# Patient Record
Sex: FEMALE | Race: WHITE | HISPANIC OR LATINO | URBAN - METROPOLITAN AREA
[De-identification: names, ages, dates, MRNs, and addresses within clinical notes are randomized per-mention and may not be internally consistent; named-entity substitution may affect disease eponyms.]

---

## 2018-07-23 ENCOUNTER — EMERGENCY (EMERGENCY)
Facility: HOSPITAL | Age: 53
LOS: 0 days | Discharge: HOME | End: 2018-07-23
Attending: EMERGENCY MEDICINE
Payer: MEDICAID

## 2018-07-23 VITALS
RESPIRATION RATE: 18 BRPM | HEART RATE: 79 BPM | DIASTOLIC BLOOD PRESSURE: 85 MMHG | TEMPERATURE: 98 F | OXYGEN SATURATION: 99 % | WEIGHT: 199.96 LBS | SYSTOLIC BLOOD PRESSURE: 120 MMHG | HEIGHT: 68 IN

## 2018-07-23 DIAGNOSIS — M25.571 PAIN IN RIGHT ANKLE AND JOINTS OF RIGHT FOOT: ICD-10-CM
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
## 2018-07-23 DIAGNOSIS — S93.491A SPRAIN OF OTHER LIGAMENT OF RIGHT ANKLE, INITIAL ENCOUNTER: ICD-10-CM
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
## 2018-07-23 PROCEDURE — 99283 EMERGENCY DEPT VISIT LOW MDM: CPT

## 2018-07-23 PROCEDURE — 73630 X-RAY EXAM OF FOOT: CPT | Mod: 26,RT

## 2018-07-23 PROCEDURE — 73610 X-RAY EXAM OF ANKLE: CPT | Mod: 26,RT

## 2018-07-23 RX ORDER — IBUPROFEN 200 MG
600 TABLET ORAL ONCE
Qty: 0 | Refills: 0 | Status: COMPLETED | OUTPATIENT
Start: 2018-07-23 | End: 2018-07-23

## 2018-07-23 RX ADMIN — Medication 600 MILLIGRAM(S): at 19:18

## 2018-07-23 NOTE — ED ADULT NURSE REASSESSMENT NOTE - NS ED NURSE REASSESS COMMENT FT1
ao x3 , xray negative for fracture , patient right ankle splinted , crutches provided , patient teaching on proper use done , teach back achieved . d/c . no sign of distress

## 2018-07-23 NOTE — ED ADULT NURSE NOTE - CAS TRG GENERAL NORM CIRC DET
Capillary refill less/equal to 2 seconds/Bounding peripheral pulses/No visible significant external bleeding/Strong peripheral pulses

## 2018-07-23 NOTE — ED PROVIDER NOTE - MUSCULOSKELETAL, MLM
Right ankle range of motion is not limited, + tenderness and swelling Right ankle range of motion is not limited, + lateral tenderness and swelling, + inversion tenderness

## 2018-07-23 NOTE — ED ADULT TRIAGE NOTE - CHIEF COMPLAINT QUOTE
pt complaining of right ankle pain. states she stepped into a pot hole and twisted her ankle. denies head injury.

## 2018-12-05 ENCOUNTER — APPOINTMENT (OUTPATIENT)
Dept: ORTHOPEDIC SURGERY | Facility: CLINIC | Age: 53
End: 2018-12-05
Payer: MEDICAID

## 2018-12-05 VITALS
WEIGHT: 238 LBS | DIASTOLIC BLOOD PRESSURE: 87 MMHG | SYSTOLIC BLOOD PRESSURE: 120 MMHG | BODY MASS INDEX: 36.07 KG/M2 | HEIGHT: 68 IN | HEART RATE: 69 BPM

## 2018-12-05 DIAGNOSIS — M48.061 SPINAL STENOSIS, LUMBAR REGION WITHOUT NEUROGENIC CLAUDICATION: ICD-10-CM

## 2018-12-05 DIAGNOSIS — Z80.3 FAMILY HISTORY OF MALIGNANT NEOPLASM OF BREAST: ICD-10-CM

## 2018-12-05 DIAGNOSIS — M51.26 OTHER INTERVERTEBRAL DISC DISPLACEMENT, LUMBAR REGION: ICD-10-CM

## 2018-12-05 DIAGNOSIS — M54.2 CERVICALGIA: ICD-10-CM

## 2018-12-05 DIAGNOSIS — M54.5 LOW BACK PAIN: ICD-10-CM

## 2018-12-05 PROCEDURE — 99204 OFFICE O/P NEW MOD 45 MIN: CPT

## 2018-12-05 RX ORDER — FOLIC ACID/MULTIVIT,IRON,MINER 0.4MG-18MG
TABLET ORAL
Refills: 0 | Status: ACTIVE | COMMUNITY

## 2018-12-05 RX ORDER — MAGNESIUM OXIDE/MAG AA CHELATE 300 MG
CAPSULE ORAL
Refills: 0 | Status: ACTIVE | COMMUNITY

## 2018-12-05 RX ORDER — MULTIVITAMIN
TABLET ORAL
Refills: 0 | Status: ACTIVE | COMMUNITY

## 2020-02-14 ENCOUNTER — EMERGENCY (EMERGENCY)
Facility: HOSPITAL | Age: 55
LOS: 0 days | Discharge: HOME | End: 2020-02-14
Attending: EMERGENCY MEDICINE
Payer: COMMERCIAL

## 2020-02-14 VITALS
OXYGEN SATURATION: 100 % | RESPIRATION RATE: 18 BRPM | HEART RATE: 83 BPM | WEIGHT: 250 LBS | HEIGHT: 68 IN | DIASTOLIC BLOOD PRESSURE: 86 MMHG | SYSTOLIC BLOOD PRESSURE: 136 MMHG | TEMPERATURE: 98 F

## 2020-02-14 VITALS
DIASTOLIC BLOOD PRESSURE: 62 MMHG | SYSTOLIC BLOOD PRESSURE: 128 MMHG | OXYGEN SATURATION: 99 % | RESPIRATION RATE: 18 BRPM | TEMPERATURE: 98 F | HEART RATE: 76 BPM

## 2020-02-14 DIAGNOSIS — G89.29 OTHER CHRONIC PAIN: ICD-10-CM

## 2020-02-14 DIAGNOSIS — M54.5 LOW BACK PAIN: ICD-10-CM

## 2020-02-14 DIAGNOSIS — M54.17 RADICULOPATHY, LUMBOSACRAL REGION: ICD-10-CM

## 2020-02-14 PROCEDURE — 72148 MRI LUMBAR SPINE W/O DYE: CPT | Mod: 26

## 2020-02-14 PROCEDURE — 99284 EMERGENCY DEPT VISIT MOD MDM: CPT

## 2020-02-14 RX ORDER — SODIUM CHLORIDE 9 MG/ML
1000 INJECTION INTRAMUSCULAR; INTRAVENOUS; SUBCUTANEOUS ONCE
Refills: 0 | Status: COMPLETED | OUTPATIENT
Start: 2020-02-14 | End: 2020-02-14

## 2020-02-14 RX ORDER — MORPHINE SULFATE 50 MG/1
4 CAPSULE, EXTENDED RELEASE ORAL ONCE
Refills: 0 | Status: DISCONTINUED | OUTPATIENT
Start: 2020-02-14 | End: 2020-02-14

## 2020-02-14 RX ORDER — METHOCARBAMOL 500 MG/1
750 TABLET, FILM COATED ORAL ONCE
Refills: 0 | Status: COMPLETED | OUTPATIENT
Start: 2020-02-14 | End: 2020-02-14

## 2020-02-14 RX ORDER — KETOROLAC TROMETHAMINE 30 MG/ML
30 SYRINGE (ML) INJECTION ONCE
Refills: 0 | Status: DISCONTINUED | OUTPATIENT
Start: 2020-02-14 | End: 2020-02-14

## 2020-02-14 RX ORDER — METHOCARBAMOL 500 MG/1
1 TABLET, FILM COATED ORAL
Qty: 20 | Refills: 0
Start: 2020-02-14 | End: 2020-02-23

## 2020-02-14 RX ADMIN — Medication 30 MILLIGRAM(S): at 15:57

## 2020-02-14 RX ADMIN — METHOCARBAMOL 750 MILLIGRAM(S): 500 TABLET, FILM COATED ORAL at 15:35

## 2020-02-14 RX ADMIN — SODIUM CHLORIDE 1000 MILLILITER(S): 9 INJECTION INTRAMUSCULAR; INTRAVENOUS; SUBCUTANEOUS at 15:52

## 2020-02-14 RX ADMIN — MORPHINE SULFATE 4 MILLIGRAM(S): 50 CAPSULE, EXTENDED RELEASE ORAL at 15:57

## 2020-02-14 NOTE — ED PROVIDER NOTE - PATIENT PORTAL LINK FT
You can access the FollowMyHealth Patient Portal offered by [facility] by registering at the following website: [email or web address]. By joining [facility]’s FollowMyHealth portal, you will also be able to view your health information using other applications (apps) compatible with our system.

## 2020-02-14 NOTE — ED PROVIDER NOTE - NSFOLLOWUPCLINICS_GEN_ALL_ED_FT
[facility]  Neurology  [address] [place] [address] [address] [place] [address]  Phone: [phone]  Fax:   Follow Up Time:

## 2020-02-14 NOTE — ED PROVIDER NOTE - MUSCULOSKELETAL, MLM
Spine appears normal, range of motion is not limited, bilateral paravertebral distal lumbar tenderness

## 2020-02-14 NOTE — ED PROVIDER NOTE - CARE PROVIDER_API CALL
Mirtha Mcmahon)  Neurology  [address] [place] [id number]  Phone: [phone]  Fax: [phone]  Follow Up Time:

## 2021-01-13 NOTE — ED ADULT NURSE REASSESSMENT NOTE - TEMPLATE LIST FOR HEAD TO TOE ASSESSMENT
Orthopedic Referred by: Tyree Alarcon MD; Medical Diagnosis (from order):    Diagnosis Information      Diagnosis    V58.89, 845.03 (ICD-9-CM) - S93.431D (ICD-10-CM) - Sprain of tibiofibular ligament of right ankle, subsequent encounter    V54.16 (ICD-9-CM) - S82.851D (ICD-10-CM) - Closed displaced trimalleolar fracture of right ankle with routine healing, subsequent encounter                Physical Therapy -  Daily Treatment Note    Visit:  15     SUBJECTIVE                                                                                                             Patient expresses that she was tired today and sore the last couple of days, at knees and down into ankle. She has been self-mobilizing scar.  Pain at medial aspect still occurring intermittently.   Functional Change: Pain still noted with down stairs walking, but has been completing without need for rails. Ambulation causing pain into mid to distal foot.    Pain / Symptoms:  Pain rating (out of 10): Current: 3     OBJECTIVE                                                                                                                      post walking    Observed Gait:   Weight bearing: Left: full  Right: full    Analysis: decreased alma/pace;    • Left: decreased stride length    • Right: decreased stance time, antalgic and decreased toe off    Range of Motion (ROM)   (degrees unless noted; active unless noted; norms in ( ); negative=lacking to 0, positive=beyond 0)    Ankle:    - Dorsiflexion (20):        • Right: 10     - Plantar Flexion (40-50):        • Right: 46    - Inversion (30-35):        • Right: 27 pain    - Eversion (15-20):        • Right: 19     Palpation:   Comments / Details: Scars tender to palpation: medial, lateral      TREATMENT                                                                                                                  Therapeutic Exercise:  [place] board dorsiflexion/plantarflexion 3x10, circles 2x10,  inversion/eversion 3x10, stabilized isometric 3x8x5 seconds  Double leg heel raise to eccentric drop 2x10  Calf stretch 3x30 seconds  Leg press with 80 lbs to heel raise 2x10  Hip abduction 1x10 left and 1x8 right 80 lbs, limited by weakness  Bird dog alternating legs 2x10  Lateral hip abduction with lateral core curl 2x10 bilateral    Neuromuscular Re-Education:  Wide -tandem stance  eyes open and closed 2x 30 seconds each   Tandem stance: on ground eyes open and closed 2x 30 seconds   Heel raised squat with hand support 2x10  Intro agility ladder with lateral steps 2x20 feet     Skilled input: verbal instruction/cues    Writer verbally educated and received verbal consent for hand placement, positioning of patient, and techniques to be performed today from patient for hand placement and palpation for techniques as described above and how they are pertinent to the patient's plan of care.    Home Exercise Program: Access Code: [email or web address]   URL: [email or web address]   Date: 12/28/2020   Prepared by: Jeff Thomason     Exercises  Single Leg Bridge - 5 reps - 3 sets - 5 hold - 2x daily - 7x weekly  Squat to Heel Raise - 10 reps - 3 sets - 2x daily - 7x weekly  Standing Knee Flexion - 10 reps - 3 sets - 2x daily - 7x weekly  Prone Hamstring Curl with Anchored Resistance - 10 reps - 3 sets - 2x daily - 7x weekly  Seated Slump Nerve Glide - 10 reps - 3 sets - 2x daily - 7x weekly  Single Leg Stance - 5 reps - 1 sets - 10-30 hold - 2x daily - 7x weekly  Sidestepping - 20 reps - 3 sets - 1x daily - 7x weekly  Forward Monster Walks - 20 reps - 3 sets - 1x daily - 7x weekly     ASSESSMENT                                                                                                             Patient shows improvement in right ankle range of motion and functional tasks, but continues to report pain and swelling. Aggravation at bilateral knees was more impactful for limitations during today's interventions. Gait  appears minimally impacted at this time. Patient will continue to benefit from skilled physical therapy intervention.          Pain/symptoms after session: 2    Patient Education:   Results of above outlined education: Verbalizes understanding and Demonstrates understanding      PLAN                                                                                                                           Suggestions for next session as indicated: Progress per plan of care with nearing final weight bearing with slow speed dynamic, then power progression.           Procedures and total treatment time documented Time Entry flowsheet.

## 2021-05-19 ENCOUNTER — APPOINTMENT (OUTPATIENT)
Dept: ORTHOPEDIC SURGERY | Facility: CLINIC | Age: 56
End: 2021-05-19

## 2021-08-13 NOTE — ED PROVIDER NOTE - CLINICAL SUMMARY MEDICAL DECISION MAKING FREE TEXT BOX
0 diagnostic imaging results reviewed with patient; analgesics, supportive care; PMD or clinic follow up recommended for reassessment. Patient is aware of signs/symptoms to return to the emergency department.

## 2022-07-27 ENCOUNTER — APPOINTMENT (OUTPATIENT)
Dept: ORTHOPEDIC SURGERY | Facility: CLINIC | Age: 57
End: 2022-07-27

## 2022-07-27 VITALS — HEIGHT: 68 IN | WEIGHT: 230 LBS | BODY MASS INDEX: 34.86 KG/M2

## 2022-07-27 DIAGNOSIS — Z78.9 OTHER SPECIFIED HEALTH STATUS: ICD-10-CM

## 2022-07-27 PROBLEM — M54.9 DORSALGIA, UNSPECIFIED: Chronic | Status: ACTIVE | Noted: 2020-02-14

## 2022-07-27 PROCEDURE — 73030 X-RAY EXAM OF SHOULDER: CPT | Mod: RT

## 2022-07-27 PROCEDURE — 72040 X-RAY EXAM NECK SPINE 2-3 VW: CPT

## 2022-07-27 PROCEDURE — 99204 OFFICE O/P NEW MOD 45 MIN: CPT

## 2022-07-27 RX ORDER — MELOXICAM 15 MG/1
15 TABLET ORAL DAILY
Qty: 30 | Refills: 1 | Status: ACTIVE | COMMUNITY
Start: 2022-07-27 | End: 1900-01-01

## 2022-07-27 NOTE — IMAGING
[de-identified] : PE R shoulder: FROM with pain at extremes, weak RC, limited IR/ER, NVI. [No bony abnormalities] : No bony abnormalities [Right] : right shoulder [There are no fractures, subluxations or dislocations. No significant abnormalities are seen] : There are no fractures, subluxations or dislocations. No significant abnormalities are seen [FreeTextEntry1] : lordosis

## 2022-07-27 NOTE — HISTORY OF PRESENT ILLNESS
[Radiating] : radiating [Shooting] : shooting [Tingling] : tingling [Intermittent] : intermittent [de-identified] : 57 y/o RHD with atraumatic R shoulder pain x 3 months. occasional numbness/tingling of RUE. Has tried tylenol for pain with relief. Occasional neck pain, chronic.  [] : Post Surgical Visit: no [FreeTextEntry1] : Rt shoulder [FreeTextEntry5] : patient complains of pain in the right shoulder for 3 months \par no strength in the right arm, unable to lift things, also complains of numbness and tingling in the right hand [FreeTextEntry6] : numbness [de-identified] : movement

## 2022-07-27 NOTE — ASSESSMENT
[FreeTextEntry1] : A/P R shoulder impingement, likely RC tear\par - MRI shoulder\par - NSAIDS\par - f/u sports\par - f/u spine prn

## 2022-08-02 ENCOUNTER — FORM ENCOUNTER (OUTPATIENT)
Age: 57
End: 2022-08-02

## 2022-08-03 ENCOUNTER — APPOINTMENT (OUTPATIENT)
Dept: MRI IMAGING | Facility: CLINIC | Age: 57
End: 2022-08-03

## 2022-08-03 PROCEDURE — 73221 MRI JOINT UPR EXTREM W/O DYE: CPT | Mod: RT

## 2022-09-06 ENCOUNTER — APPOINTMENT (OUTPATIENT)
Dept: ORTHOPEDIC SURGERY | Facility: CLINIC | Age: 57
End: 2022-09-06

## 2022-09-06 VITALS — BODY MASS INDEX: 34.86 KG/M2 | WEIGHT: 230 LBS | HEIGHT: 68 IN

## 2022-09-06 PROCEDURE — 99214 OFFICE O/P EST MOD 30 MIN: CPT

## 2022-09-06 NOTE — DISCUSSION/SUMMARY
[de-identified] : plan for ultrasound guided aspiration/corticosteroid injection rig shoulder subscap calcium deposit. PT. fu 4wk\par \par ------------------------------------------------------------------------------------------------------------------------------------------------------\par \par The patient was advised of the diagnosis.  The natural history of the pathology was explained in full. All questions were answered.  The risks and benefits of conservative and interventional treatment alternatives were explained to the patient\par \par

## 2022-09-06 NOTE — HISTORY OF PRESENT ILLNESS
[Gradual] : gradual [5] : 5 [2] : 2 [Dull/Aching] : dull/aching [Localized] : localized [Tightness] : tightness [Intermittent] : intermittent [Household chores] : household chores [Leisure] : leisure [Sleep] : sleep [Meds] : meds [Full time] : Work status: full time [de-identified] : 56  female was having rigth shoulder pain and arm weakness in may/june. notes that she would have diffiulty lifting the arm. notes that she visited UC . she has been taking nsaids, magnesium. has had improvement but still has weakness and discomfort, soreness. pain in the superior and anteriror shoulder [] : no [FreeTextEntry1] : right shoulder  [FreeTextEntry9] : Ibuprofen, Magnesium [de-identified] : Activity  [de-identified] : 08/01/2022 [de-identified] : ADARSH Mcclure  [de-identified] : [facility]

## 2022-09-06 NOTE — DATA REVIEWED
[MRI] : MRI [Right] : of the right [Shoulder] : shoulder [I independently reviewed and interpreted images and report] : I independently reviewed and interpreted images and report [FreeTextEntry1] : right shoulder calcific tendinopathy in the subscap. ac arthrosis, slap tear, mild biceps tenosynovitis

## 2022-09-06 NOTE — IMAGING
[de-identified] : \par ------------------------------------------------------------------------------------------------------------------------------------------------------\par \par Right shoulder exam: \par \par Skin: no significant pertinent finding\par Inspection: no obvious deformity, no obvious masses, no swelling, no effusion, no atrophy\par ROM: \par    FF: 170\par    ER: 70\par    IR: T12\par Tenderness:\par    +Anterior/Biceps: \par    (-) Posterior\par    (-) Lateral\par    (-) Trapezius\par    (-) Scapula\par    +AC joint\par    (-) Crepitus with ROM\par Stability: \par    (-) Translation\par    (-) Apprehension\par    (-) Clicking\par Additional tests: \par    +Neer's\par    +Hawkin's\par    (-) Bailey's\par    (-) Speed\par    (-) Cross chest adduction\par Strength: \par    FF: 5/5\par    ER: 5/5\par    IR: 5/5\par    Biceps: 5/5\par    Triceps: 5/5\par    Distal: 5/5\par Neuro: In tact to light touch throughout\par Vascularity: Extremity warm and well perfused\par \par \par  [Right] : right shoulder [Calcific density] : Calcific density

## 2022-11-02 ENCOUNTER — APPOINTMENT (OUTPATIENT)
Dept: ORTHOPEDIC SURGERY | Facility: CLINIC | Age: 57
End: 2022-11-02

## 2022-11-02 DIAGNOSIS — M75.41 IMPINGEMENT SYNDROME OF RIGHT SHOULDER: ICD-10-CM

## 2022-11-02 DIAGNOSIS — M75.31 CALCIFIC TENDINITIS OF RIGHT SHOULDER: ICD-10-CM

## 2022-11-02 PROCEDURE — 99214 OFFICE O/P EST MOD 30 MIN: CPT

## 2022-11-02 PROCEDURE — 99204 OFFICE O/P NEW MOD 45 MIN: CPT

## 2022-11-02 RX ORDER — DICLOFENAC SODIUM 75 MG/1
75 TABLET, DELAYED RELEASE ORAL TWICE DAILY
Qty: 60 | Refills: 0 | Status: ACTIVE | COMMUNITY
Start: 2022-11-02 | End: 1900-01-01

## 2022-11-02 NOTE — DATA REVIEWED
[MRI] : MRI [Right] : of the right [Shoulder] : shoulder [Report was reviewed and noted in the chart] : The report was reviewed and noted in the chart [I independently reviewed and interpreted images and report] : I independently reviewed and interpreted images and report [FreeTextEntry1] : 1. Diffuse rotator cuff tendinopathy with large calcifications at the subscapularis tendon insertion where there is \par mild surrounding bursitis without tendon discontinuity.\par 2. Biceps tenosynovitis with possible slight medial subluxation and tearing at the junction of the superior and \par anterior labrum with slight surrounding effusion and synovitis.\par 3. AC joint arthrosis and lateral acromial bone spurs.\par 4. No acute fracture or disproportionate muscle atrophy.\par 5. Correlation with plain film is recommended.

## 2022-11-02 NOTE — IMAGING
[de-identified] : The patient is a well appearing 57 year year old female of their stated age.\par Neck is supple & nontender to palpation. Negative Spurling's test.\par \par General: in no acute distress, seated comfortably, moving easily\par Skin: No discoloration, rashes; on palpation skin is dry, \par Neuro: Normal sensation all dermatomes, motor all myotomes\par Vascular: Normal pulses, no edema, normal temperature\par Coordination and balance: Normal\par Psych: normal mood and affect, non pressured speech, alert and oriented x3\par \par Effected Shoulder \par Inspection:\par Scapula Winging: Negative\par Deformity: None\par Erythema: None\par Ecchymosis: None\par Abrasions: None\par Effusion: None\par \par Range of Motion:\par Active Forward Flexion: 160 degrees \par Passive Forward Flexion: 170 degrees \par Active IR : L4\par Passive ER : 30 degrees\par \par Motor Exam:\par Forward Flexion: 4+ out of 5\par Flexion Plane of Scapula: 5 out of 5\par Abduction: 4+ out of 5\par Internal Rotation: 5 out of 5\par External Rotation: 4+ out of 5\par Distal Motor Strength: 5 out of 5\par \par Stability Testing:\par Anterior: 1+\par Posterior: 1+\par Sulcus N: 1+\par Sulcus ER: 1+\par \par Provocative Tests:\par Drop Arm: Negative\par Ramos/Impingement: Positive\par [place]: Positive\par X-Arm Adduction: Negative\par Belly Press: Negative\par Bear Hug: Negative\par Lift Off: Negative\par Apprehension: Negative\par Relocation: Negative\par Posterior Load & Shift: Negative\par \par Palpation:\par AC Joint: Nontender\par Clavicle: Nontender\par SC Joint: Nontender\par Bicepital Groove: Positive\par Coracoid Process: Nontender\par Pectoralis Minor Tendon: Nontender\par Pectoralis Major Tendon: Nontender & palpably intact\par Latissimus Dorsi: Nontender \par Proximal Humerus: Positive\par Scapula Body: Nontender\par Medial Scapula Boarder: Nontender\par Scapula Spine: Nontender\par TTP Subscapularis \par \par Neurologic Exam: Sensation to Light Touch:\par Axillary: Grossly intact\par Ulnar: Grossly intact\par Radial: Grossly intact\par Median: Grossly intact\par Other:  N/A\par \par Circulatory/Pulses:\par Ulnar: 2+\par Radial: 2+\par Other Pertinent Findings: None\par \par Contralateral Shoulder\par Range of Motion:\par Active Forward Flexion: 180 degrees \par Active Abduction: 180 degrees \par Passive Forward Flexion: 180 degrees \par Passive Abduction: 180 degrees \par ER @ 90 degrees: 90 degrees\par IR @ 90 degrees: 45 degrees\par ER @ 0 degrees: 50 degrees\par \par Motor Exam:\par Forward Flexion: 5 out of 5\par Flexion Plane of Scapula: 5 out of 5\par Abduction: 5 out of 5\par Internal Rotation: 5 out of 5\par External Rotation: 5 out of 5\par Distal Motor Strength: 5 out of 5\par \par Stability Testing:\par Anterior: 1+\par Posterior: 1+\par Sulcus N: 1+\par Sulcus ER: 1+\par \par Other Pertinent Findings: None\par   [Right] : right shoulder [There are no fractures, subluxations or dislocations. No significant abnormalities are seen] : There are no fractures, subluxations or dislocations. No significant abnormalities are seen [Type 2 acromion] : Type 2 acromion [Calcific density] : Calcific density

## 2022-11-02 NOTE — HISTORY OF PRESENT ILLNESS
[Sudden] : sudden [Localized] : localized [Tightness] : tightness [Intermittent] : intermittent [Sleep] : sleep [Meds] : meds [de-identified] : Dr. Vang: 57 female was having rigth shoulder pain and arm weakness in may/june. notes that she would have diffiulty lifting the arm. notes that she visited  . she has been taking nsaids, magnesium. has had improvement but still has weakness and discomfort, soreness. pain in the superior and anteriror shoulder\par \par 11/02/2022 56 y/o RHD F with R shoulder pain since late April 2022\par Pain started after helping her brother out of a wheelchair\par Seen by Dr. Vang, diagnosed with calcific tendonitis, ordered IR aspirtation and CSI\par She state IR doctor performed CSI, was unable to perform aspiration "due to size of calcific density"\par She felt relief of pain with CSI, has not done PT [] : no [FreeTextEntry1] : right shoulder  [FreeTextEntry5] : NOTE FROM [facility] 09/06/22: 56 female was having rigth shoulder pain and arm weakness in may/june. notes that she would have diffiulty lifting the arm. notes that she visited . she has been taking nsaids, magnesium. has had improvement but still has weakness and discomfort, soreness. pain in the superior and anteriror shoulder \par \par 11/02/22: TAMI lou [RHD] 57 year old female is here today right shoulder pain. she saw [facility] on 09/06/22, she was sent for an MRI and injection. pt states since then  [de-identified] : 09/06/22 [de-identified] : Jessicaib  [de-identified] : MRI

## 2024-03-16 ENCOUNTER — EMERGENCY (EMERGENCY)
Facility: HOSPITAL | Age: 59
LOS: 1 days | Discharge: HOME | End: 2024-03-16
Attending: EMERGENCY MEDICINE
Payer: MEDICAID

## 2024-03-16 VITALS
DIASTOLIC BLOOD PRESSURE: 87 MMHG | RESPIRATION RATE: 19 BRPM | WEIGHT: 179.9 LBS | SYSTOLIC BLOOD PRESSURE: 146 MMHG | HEART RATE: 99 BPM | TEMPERATURE: 98 F | OXYGEN SATURATION: 100 % | HEIGHT: 68 IN

## 2024-03-16 LAB
ADD ON TEST-SPECIMEN IN LAB: SIGNIFICANT CHANGE UP
ALBUMIN SERPL ELPH-MCNC: 4.6 G/DL — SIGNIFICANT CHANGE UP (ref 3.3–5)
ALP SERPL-CCNC: 68 U/L — SIGNIFICANT CHANGE UP (ref 40–120)
ALT FLD-CCNC: 24 U/L — SIGNIFICANT CHANGE UP (ref 10–45)
ANION GAP SERPL CALC-SCNC: 14 MMOL/L — SIGNIFICANT CHANGE UP (ref 5–17)
AST SERPL-CCNC: 24 U/L — SIGNIFICANT CHANGE UP (ref 10–40)
BASOPHILS # BLD AUTO: 0.03 K/UL — SIGNIFICANT CHANGE UP (ref 0–0.2)
BASOPHILS NFR BLD AUTO: 0.4 % — SIGNIFICANT CHANGE UP (ref 0–2)
BILIRUB SERPL-MCNC: 0.4 MG/DL — SIGNIFICANT CHANGE UP (ref 0.2–1.2)
BUN SERPL-MCNC: 18 MG/DL — SIGNIFICANT CHANGE UP (ref 7–23)
CALCIUM SERPL-MCNC: 10 MG/DL — SIGNIFICANT CHANGE UP (ref 8.4–10.5)
CHLORIDE SERPL-SCNC: 102 MMOL/L — SIGNIFICANT CHANGE UP (ref 96–108)
CO2 SERPL-SCNC: 23 MMOL/L — SIGNIFICANT CHANGE UP (ref 22–31)
CREAT SERPL-MCNC: 0.77 MG/DL — SIGNIFICANT CHANGE UP (ref 0.5–1.3)
EGFR: 90 ML/MIN/1.73M2 — SIGNIFICANT CHANGE UP
EOSINOPHIL # BLD AUTO: 0.04 K/UL — SIGNIFICANT CHANGE UP (ref 0–0.5)
EOSINOPHIL NFR BLD AUTO: 0.5 % — SIGNIFICANT CHANGE UP (ref 0–6)
GLUCOSE SERPL-MCNC: 103 MG/DL — HIGH (ref 70–99)
HCT VFR BLD CALC: 39.2 % — SIGNIFICANT CHANGE UP (ref 34.5–45)
HGB BLD-MCNC: 12.7 G/DL — SIGNIFICANT CHANGE UP (ref 11.5–15.5)
IMM GRANULOCYTES NFR BLD AUTO: 0.3 % — SIGNIFICANT CHANGE UP (ref 0–0.9)
LYMPHOCYTES # BLD AUTO: 1.86 K/UL — SIGNIFICANT CHANGE UP (ref 1–3.3)
LYMPHOCYTES # BLD AUTO: 24.5 % — SIGNIFICANT CHANGE UP (ref 13–44)
MCHC RBC-ENTMCNC: 27.9 PG — SIGNIFICANT CHANGE UP (ref 27–34)
MCHC RBC-ENTMCNC: 32.4 GM/DL — SIGNIFICANT CHANGE UP (ref 32–36)
MCV RBC AUTO: 86 FL — SIGNIFICANT CHANGE UP (ref 80–100)
MONOCYTES # BLD AUTO: 0.62 K/UL — SIGNIFICANT CHANGE UP (ref 0–0.9)
MONOCYTES NFR BLD AUTO: 8.2 % — SIGNIFICANT CHANGE UP (ref 2–14)
NEUTROPHILS # BLD AUTO: 5.01 K/UL — SIGNIFICANT CHANGE UP (ref 1.8–7.4)
NEUTROPHILS NFR BLD AUTO: 66.1 % — SIGNIFICANT CHANGE UP (ref 43–77)
NRBC # BLD: 0 /100 WBCS — SIGNIFICANT CHANGE UP (ref 0–0)
PLATELET # BLD AUTO: 282 K/UL — SIGNIFICANT CHANGE UP (ref 150–400)
POTASSIUM SERPL-MCNC: 3.8 MMOL/L — SIGNIFICANT CHANGE UP (ref 3.5–5.3)
POTASSIUM SERPL-SCNC: 3.8 MMOL/L — SIGNIFICANT CHANGE UP (ref 3.5–5.3)
PROT SERPL-MCNC: 8.3 G/DL — SIGNIFICANT CHANGE UP (ref 6–8.3)
RBC # BLD: 4.56 M/UL — SIGNIFICANT CHANGE UP (ref 3.8–5.2)
RBC # FLD: 14.4 % — SIGNIFICANT CHANGE UP (ref 10.3–14.5)
SODIUM SERPL-SCNC: 139 MMOL/L — SIGNIFICANT CHANGE UP (ref 135–145)
TROPONIN T, HIGH SENSITIVITY RESULT: 9 NG/L — SIGNIFICANT CHANGE UP (ref 0–51)
WBC # BLD: 7.58 K/UL — SIGNIFICANT CHANGE UP (ref 3.8–10.5)
WBC # FLD AUTO: 7.58 K/UL — SIGNIFICANT CHANGE UP (ref 3.8–10.5)

## 2024-03-16 PROCEDURE — 71046 X-RAY EXAM CHEST 2 VIEWS: CPT | Mod: 26

## 2024-03-16 PROCEDURE — 99285 EMERGENCY DEPT VISIT HI MDM: CPT

## 2024-03-16 RX ORDER — SODIUM CHLORIDE 9 MG/ML
1000 INJECTION INTRAMUSCULAR; INTRAVENOUS; SUBCUTANEOUS ONCE
Refills: 0 | Status: COMPLETED | OUTPATIENT
Start: 2024-03-16 | End: 2024-03-16

## 2024-03-16 RX ADMIN — SODIUM CHLORIDE 1000 MILLILITER(S): 9 INJECTION INTRAMUSCULAR; INTRAVENOUS; SUBCUTANEOUS at 22:14

## 2024-03-16 NOTE — ED PROVIDER NOTE - PROGRESS NOTE DETAILS
Attending MD Aldrich: Patient re-evaluated and feeling unimproved.  Lab and radiology tests reviewed with patient and sister and other family.  Patient reports persistent chest discomfort, grossly unchanged.  PMD Dr. Destini Lorenzo. Jessica Daniels MD PGY-2: repeat trop Jessica Daniels MD PGY-2: repeat trop 10.  Patient reassessed, states she continues to experience some chest and epigastric discomfort.  She is agreeable for plan for admission at this time.  Will page hospitalist Jessica Daniels MD PGY-2: discussed admission with [facility] who requests we trial GI cocktail for burning pain prior to admission. will order. Attending MD Aldrich: Feeling resolution of pain after GI cocktail, stable for discharge. Follow up instructions given, importance of follow up emphasized, return to ED parameters reviewed and patient verbalized understanding.  All questions answered, all concerns addressed.

## 2024-03-16 NOTE — ED PROVIDER NOTE - DATE/TIME 2
This patient is being seen in consultation from Roman Dreyer, MD    CHIEF COMPLAINT(S): No chief complaint on file.      HISTORY OF PRESENT ILLNESS:  Yasmin Oneill is a 71 year old {Righthanded/lefthanded:09378::\"right handed\"} female who presents to the clinic today for an initial consultation concerning her right humeral fracture that occurred on ***. She was seen by Dr. Dreyer on 1/18/19 where she had X-rays taken. She was originally scheduled with Dr. Dover on 1/22/19 and 1/29/19 but these appointments were canceled due to weather. ***  Accompanied by ***  Location: {LOCATION EXT PAIN:029780}  Date of Onset: ***  Context: see above  Severity:{number 1-15:477137}/10  Timing: {PAIN DESCRIPTION:210590}   Quality: {GEN PAIN:408811}  Associated signs and symptoms: {[facility] AMB SPM PAIN RELATED SYMPTOMS:312161}  Aggravating factors: {[facility] AMB SPM PAIN EXTREMITIES MADE WORSE BY ORTHO:697210}  Alleviating factors: {[facility] AMB SPM PAIN EXTREMITIES HELPED BY:482409}  ***    REVIEW OF SYSTEMS:  Skin: {ROS - SKIN:806285::\"No problems with hair or nails. No rash. No new skin lesions.\"}  Heent: {ROS HEENT:457623::\"Pt denies problems with head, eyes, ears, nose, mouth, throat and neck\"}  Respiratory: {ROS RESP:843119::\"No coughing, wheezing, changes in voice, nor shortness of breath\"}  Cardiovascular: {ROS CARDIAC:363584::\"No chest pain, palpitations or other cardiac complaints noted\"}  Gastrointestinal: {[facility] AMB SPM ROS GI:142368::\"No diarrhea, constipation, abdominal pain or other complaints noted\"}  Genitourinary: {[facility] AMB SPM OPHTH :635756::\"Pt denies urinary pain, bleeding and voiding problems\"}  Musculoskeletal: {ROS MUSCULOSKELETAL:386757::\"Pt denies pain, swelling, weakness or limitation of motion\"}  Neurologic: {ROS NEURO:841017::\"Pt denies syncope, seizures, paralysis, involuntary movements or gait problems\"}  Psychiatric: {ROS PSYCH:170054::\"Pt denies sleep, anxiety, depression, sexual and other psychiatric  problems\"}  Hematologic/Lymphatic/Immunologic: {ROS HEME:553534::\"Pt denies hematological, lymphatic and immunological problems\"}  Endocrine: {ROS ENDO:861387::\"Pt denies thyroid and diabetic problems\"}        Past Medical History Updated: {Y/N:002182}  PAST MEDICAL HISTORY:  No past medical history on file.    Past Surgical History Updated: {Y/N:331538}   PAST SURGICAL HISTORY:  No past surgical history on file.    Past Family History Updated: {Y/N:877878}   FAMILY HISTORY:  No family history on file.    Past Social History Updated: {Y/N:480079}  SOCIAL HISTORY:  Social History     Socioeconomic History   • Marital status: /Civil Union     Spouse name: Not on file   • Number of children: Not on file   • Years of education: Not on file   • Highest education level: Not on file   Social Needs   • Financial resource strain: Not on file   • Food insecurity - worry: Not on file   • Food insecurity - inability: Not on file   • Transportation needs - medical: Not on file   • Transportation needs - non-medical: Not on file   Occupational History   • Not on file   Tobacco Use   • Smoking status: Former Smoker   • Smokeless tobacco: Never Used   Substance and Sexual Activity   • Alcohol use: No     Frequency: Never   • Drug use: No   • Sexual activity: Not on file   Other Topics Concern   • Not on file   Social History Narrative   • Not on file       MEDICATIONS:  Current Outpatient Medications   Medication Sig Dispense Refill   • albuterol (PROAIR HFA) 108 (90 Base) MCG/ACT inhaler INHALE TWO PUFFS BY MOUTH EVERY 6 HOURS AS NEEDED FOR WHEEZING     • aspirin 81 MG chewable tablet Chew 81 mg by mouth.     • blood glucose (ONE TOUCH ULTRA TEST) test strip Test 2 times daily. Diabetes mellitus type II, uncontrolled E11.65     • Insulin Pen Needle (PEN NEEDLES) 31G X 6 MM Misc Use 1 needle daily with toujeo     • levothyroxine (SYNTHROID, LEVOTHROID) 50 MCG tablet Take 50 mcg by mouth.     • mometasone (NASONEX) 50 MCG/ACT  nasal spray USE TWO SPRAY(S) IN EACH NOSTRIL ONCE DAILY     • olopatadine (PATANASE) 0.6 % nasal spray Spray 2 sprays in each nostril.     • olopatadine (PATANOL) 0.1 % ophthalmic solution 1 drop.     • ranitidine (ZANTAC) 150 MG tablet TAKE ONE TABLET BY MOUTH TWICE DAILY     • sertraline (ZOLOFT) 50 MG tablet TAKE ONE TABLET BY MOUTH ONCE DAILY     • TOUJEO SOLOSTAR 300 UNIT/ML pen-injector INJECT 45 UNITS INTO THE SKIN DAILY 24 mL 1   • pioglitazone (ACTOS) 30 MG tablet TAKE 1 TABLET BY MOUTH ONCE DAILY 90 tablet 0     No current facility-administered medications for this visit.        ALLERGIES:   ALLERGIES:  Azithromycin and Tetracycline    ***    VITAL SIGNS:  There were no vitals taken for this visit.    EXAM:  This is a 71 year old female, awake, alert, and cooperative. She is well nourished, well developed, and in no apparent distress.  ***    IMAGING &TEST:  ***    ASSESSMENT & PLAN:  Yasmin Oneill is a 71 year old female ***     Restrictions: ***    The patient will follow up with *** or us in {DAYS 1-31:905558} {TIME FRAME:679595}    Electronically Signed by:  Jozef Dover MD 02/11/19    This note was shared with ***         17-Mar-2024 03:40

## 2024-03-16 NOTE — ED PROVIDER NOTE - PHYSICAL EXAMINATION
GENERAL: Awake, alert, appears anxious  HEAD: NC/AT, neck supple, moist mucous membranes  EYES: PERRL, EOM grossly intact, sclera anicteric  LUNGS: normal WOB on RA, CTAB, no wheezes or crackles   CARDIAC: RRR, no m/r/g  ABDOMEN: Soft, non tender, non distended, no rebound, no guarding  EXT: No edema, no calf tenderness, no deformities.  NEURO: A&Ox3. Moving all extremities.  SKIN: Warm and dry. No rash.  PSYCH: Normal affect.

## 2024-03-16 NOTE — ED ADULT NURSE NOTE - OBJECTIVE STATEMENT
Pt is a 58y F PMH chronic back pain c/o syncopal episode. Pt was in hospital visiting sister who is hospitalized, pt then had syncopal episode. Pt endorses LOC. Pt denies cp, fever, chills, cough, sob, NVD, abd pain.  Pt is A&Ox3, neuro status intact, breathing unlabored and spontaneous, pt resting in stretcher, bed in lowest position, aware of plan of care. Comfort and safety measures maintained.

## 2024-03-16 NOTE — ED PROVIDER NOTE - CLINICAL SUMMARY MEDICAL DECISION MAKING FREE TEXT BOX
58-year-old female with no significant PMH presenting after near syncopal episode and chest discomfort after receiving upsetting news while visiting a family member upstairs currently admitted to the palliative floor.  Only experiencing some burning epigastric and chest pain.  No other symptoms.  Did not actually pass out/fall, no trauma.  Suspect possible emotional response to bad news patient has been experiencing, possible component of dehydration/orthostasis as she admits she has at the hospital a lot recently with family and has been eating or drinking as much as she maybe should.  EKG NSR.  Will check basic labs including troponin, placed on cardiac monitor and give fluid bolus.  Anticipate DC pending lab results, symptom relief 58-year-old female with no significant PMH presenting after near syncopal episode and chest discomfort after receiving upsetting news while visiting a family member upstairs currently admitted to the palliative floor.  Only experiencing some burning epigastric and chest pain.  No other symptoms.  Did not actually pass out/fall, no trauma.  Suspect possible emotional response to bad news patient has been experiencing, possible component of dehydration/orthostasis as she admits she has at the hospital a lot recently with family and has been eating or drinking as much as she maybe should.  EKG NSR.  Will check basic labs including troponin, placed on cardiac monitor and give fluid bolus.  Anticipate DC pending lab results, symptom relief  RGUJRAL 57yo female presents s/p near syncope. pt was visiting her sister who is undergoing end of life treatment and plan and became lightheaded at the time. Pt did not lose consciousness or trauma. Pt complains of mild chest burning since episode. Denies any other prior recent illness or symptoms of chest pain.   On exam, Patient is awake,alert,oriented x 3. Patient is well appearing and in no acute distress. Patient's chest is clear to ausculation, +s1s2. Abdomen is soft nd/nt +BS. Extremity with no swelling or calf tenderness. Neuro intact.  EKG reviewed. Check labs, Xray chest to screen electrolytes, eval for ACS. DDx vasovagal syncope in setting of grieving news. Monitor and re evaluate.

## 2024-03-16 NOTE — ED PROVIDER NOTE - NSFOLLOWUPINSTRUCTIONS_ED_ALL_ED_FT
YOU WERE SEEN IN THE ED FOR: chest pain    WHILE YOU WERE HERE, YOU HAD: lab work, chest x-ray, EKG.  You were given IV fluids, Maalox and Pepcid.    PLEASE CONTINUE TAKING YOUR HOME MEDICATIONS AS PRESCRIBED.    FOR PAIN, YOU MAY TAKE TYLENOL (ACETAMINOPHEN). FOLLOW THE INSTRUCTIONS ON THE LABEL/CONTAINER.  DO NOT EXCEED 3000MG OF TYLENOL (ACETAMINOPHEN) IN A 24 HOUR PERIOD.    PLEASE FOLLOW UP WITH YOUR PRIVATE PHYSICIAN WITHIN THE NEXT 48 HOURS. BRING COPIES OF YOUR RESULTS.    RETURN TO THE EMERGENCY DEPARTMENT IF YOU EXPERIENCE ANY NEW/CONCERNING/WORSENING SYMPTOMS SUCH AS BUT NOT LIMITED TO: worsening pain, chest pain, shortness of breath, severe abdominal pain or back pain, persistent vomiting, loss of urinary or bowel continence, numbness, weakness or tingling in extremities (arms or legs), blood in your urine, stool, or vomit, severe headache, sudden vision loss or double vision, or any other concern.

## 2024-03-16 NOTE — ED PROVIDER NOTE - PATIENT PORTAL LINK FT
You can access the FollowMyHealth Patient Portal offered by [facility] by registering at the following website: [email or web address]. By joining "[employer]’s FollowMyHealth portal, you will also be able to view your health information using other applications (apps) compatible with our system.

## 2024-03-17 VITALS
OXYGEN SATURATION: 100 % | HEART RATE: 71 BPM | RESPIRATION RATE: 18 BRPM | SYSTOLIC BLOOD PRESSURE: 116 MMHG | DIASTOLIC BLOOD PRESSURE: 78 MMHG | TEMPERATURE: 98 F

## 2024-03-17 LAB
APPEARANCE UR: CLEAR — SIGNIFICANT CHANGE UP
BACTERIA # UR AUTO: NEGATIVE /HPF — SIGNIFICANT CHANGE UP
BILIRUB UR-MCNC: NEGATIVE — SIGNIFICANT CHANGE UP
CAST: 0 /LPF — SIGNIFICANT CHANGE UP (ref 0–4)
COLOR SPEC: YELLOW — SIGNIFICANT CHANGE UP
DIFF PNL FLD: NEGATIVE — SIGNIFICANT CHANGE UP
GLUCOSE UR QL: NEGATIVE MG/DL — SIGNIFICANT CHANGE UP
KETONES UR-MCNC: 15 MG/DL
LEUKOCYTE ESTERASE UR-ACNC: NEGATIVE — SIGNIFICANT CHANGE UP
NITRITE UR-MCNC: NEGATIVE — SIGNIFICANT CHANGE UP
PH UR: 8.5 (ref 5–8)
PROT UR-MCNC: NEGATIVE MG/DL — SIGNIFICANT CHANGE UP
RBC CASTS # UR COMP ASSIST: 3 /HPF — SIGNIFICANT CHANGE UP (ref 0–4)
SP GR SPEC: 1.02 — SIGNIFICANT CHANGE UP (ref 1–1.03)
SQUAMOUS # UR AUTO: 3 /HPF — SIGNIFICANT CHANGE UP (ref 0–5)
TROPONIN T, HIGH SENSITIVITY RESULT: 10 NG/L — SIGNIFICANT CHANGE UP (ref 0–51)
TSH SERPL-MCNC: 2.68 UIU/ML — SIGNIFICANT CHANGE UP (ref 0.27–4.2)
UROBILINOGEN FLD QL: 1 MG/DL — SIGNIFICANT CHANGE UP (ref 0.2–1)
WBC UR QL: 2 /HPF — SIGNIFICANT CHANGE UP (ref 0–5)

## 2024-03-17 PROCEDURE — 80053 COMPREHEN METABOLIC PANEL: CPT

## 2024-03-17 PROCEDURE — 87086 URINE CULTURE/COLONY COUNT: CPT

## 2024-03-17 PROCEDURE — 83735 ASSAY OF MAGNESIUM: CPT

## 2024-03-17 PROCEDURE — 96374 THER/PROPH/DIAG INJ IV PUSH: CPT

## 2024-03-17 PROCEDURE — 36415 COLL VENOUS BLD VENIPUNCTURE: CPT

## 2024-03-17 PROCEDURE — 93005 ELECTROCARDIOGRAM TRACING: CPT

## 2024-03-17 PROCEDURE — 84443 ASSAY THYROID STIM HORMONE: CPT

## 2024-03-17 PROCEDURE — 71046 X-RAY EXAM CHEST 2 VIEWS: CPT

## 2024-03-17 PROCEDURE — 85025 COMPLETE CBC W/AUTO DIFF WBC: CPT

## 2024-03-17 PROCEDURE — 84484 ASSAY OF TROPONIN QUANT: CPT

## 2024-03-17 PROCEDURE — 99285 EMERGENCY DEPT VISIT HI MDM: CPT | Mod: 25

## 2024-03-17 PROCEDURE — 81001 URINALYSIS AUTO W/SCOPE: CPT

## 2024-03-17 RX ORDER — FAMOTIDINE 10 MG/ML
20 INJECTION INTRAVENOUS ONCE
Refills: 0 | Status: COMPLETED | OUTPATIENT
Start: 2024-03-17 | End: 2024-03-17

## 2024-03-17 RX ADMIN — Medication 30 MILLILITER(S): at 04:26

## 2024-03-17 RX ADMIN — FAMOTIDINE 20 MILLIGRAM(S): 10 INJECTION INTRAVENOUS at 04:26

## 2024-03-18 LAB
CULTURE RESULTS: SIGNIFICANT CHANGE UP
SPECIMEN SOURCE: SIGNIFICANT CHANGE UP

## 2024-04-26 ENCOUNTER — APPOINTMENT (OUTPATIENT)
Dept: CARDIOLOGY | Facility: CLINIC | Age: 59
End: 2024-04-26

## 2024-11-07 NOTE — ED ADULT NURSE NOTE - NEURO MENTATION
[email or web address]

## 2025-08-13 ENCOUNTER — APPOINTMENT (OUTPATIENT)
Dept: UROLOGY | Facility: CLINIC | Age: 60
End: 2025-08-13